# Patient Record
Sex: FEMALE | Race: WHITE | NOT HISPANIC OR LATINO | Employment: FULL TIME | URBAN - METROPOLITAN AREA
[De-identification: names, ages, dates, MRNs, and addresses within clinical notes are randomized per-mention and may not be internally consistent; named-entity substitution may affect disease eponyms.]

---

## 2024-01-16 ENCOUNTER — TELEMEDICINE (OUTPATIENT)
Dept: GASTROENTEROLOGY | Facility: CLINIC | Age: 54
End: 2024-01-16
Payer: COMMERCIAL

## 2024-01-16 DIAGNOSIS — R13.19 ESOPHAGEAL DYSPHAGIA: ICD-10-CM

## 2024-01-16 DIAGNOSIS — K21.9 GASTROESOPHAGEAL REFLUX DISEASE, UNSPECIFIED WHETHER ESOPHAGITIS PRESENT: Primary | ICD-10-CM

## 2024-01-16 PROCEDURE — 99204 OFFICE O/P NEW MOD 45 MIN: CPT | Performed by: INTERNAL MEDICINE

## 2024-01-16 ASSESSMENT — ENCOUNTER SYMPTOMS
MUSCULOSKELETAL NEGATIVE: 1
PSYCHIATRIC NEGATIVE: 1
CARDIOVASCULAR NEGATIVE: 1
CONSTITUTIONAL NEGATIVE: 1
NEUROLOGICAL NEGATIVE: 1
EYES NEGATIVE: 1
RESPIRATORY NEGATIVE: 1
ENDOCRINE NEGATIVE: 1
ROS GI COMMENTS: AS IN HPI

## 2024-01-16 NOTE — PROGRESS NOTES
Subjective     History of Present Illness:   Ivette Jerome is a 53 y.o. female who presents to GI clinic for refractory heartburn.    Reflux for a long time and has been on PPIS for a long time   But more recently has been having worsening symptoms for the past 6 months  Main symptom is heartburn, constant cough, raspy voice, ocassional regurgitation  Currently on dexilant 60 and pepsid 40 and no significant change in symptoms   Dysphagia that improved with dilation she says 100% better   Original EGD showed questionable Bauer's esophagus with negative biopsies as well as small hiatal hernia.  Empiric dilation was done.  Subsequently she had an esophagram that mentioned a small hiatal hernia with mild reflux, distal esophageal mucosal irregularity, barium tablet did not pass the level of the mid esophagus, premature spillage of the contrast into the esophagus prior to initiation of swallowing.  In addition to tertiary contractions.  Patient said that she would like to get off of medications if possible because she does not think it is helping at all          Review of Systems  Review of Systems   Constitutional: Negative.    HENT: Negative.     Eyes: Negative.    Respiratory: Negative.     Cardiovascular: Negative.    Gastrointestinal:         As in HPI    Endocrine: Negative.    Genitourinary: Negative.    Musculoskeletal: Negative.    Skin: Negative.    Neurological: Negative.    Psychiatric/Behavioral: Negative.         Past Medical History   has no past medical history on file.     Social History    Nonsmoker. Nonalcoholic     Family History  family history is not on file.     Allergies  Not on File    Medications  No current outpatient medications     Objective   There were no vitals taken for this visit.    exam was not done because this is a virtual visit.          Assessment/Plan   Ivette Jerome is a 53 y.o. female who presents to GI clinic for possible refractory GERD.  Patient has significant reflux symptoms  with typical symptoms of heartburn regurgitation and also some atypical symptoms of globus and hoarseness.  I am not really sure if this is all truly refractory heartburn or she has other problems such as dysmotility problems particularly with the results of the esophagram.  I did explain to her that I cannot make anything out of the premature spillage of the contrast but there appears to be some delay and contrast passage on the esophagram     since she wants to stop the medications anyways I would rather she does that slowly and I told her to taper down the dexilant slowly and then stop it completely   We will schedule her for an EMOT as well as a ph impedance off meds to evaluate for any other motility disorders as well Determine the reflux burden to determine the reflux burden.  If she does have significant pathologic reflux and will consider surgical repair of the hiatal          Matthieu Boo MD

## 2024-02-05 ENCOUNTER — HOSPITAL ENCOUNTER (OUTPATIENT)
Dept: GASTROENTEROLOGY | Facility: HOSPITAL | Age: 54
Discharge: HOME | End: 2024-02-05
Payer: COMMERCIAL

## 2024-02-05 VITALS
DIASTOLIC BLOOD PRESSURE: 65 MMHG | SYSTOLIC BLOOD PRESSURE: 117 MMHG | RESPIRATION RATE: 18 BRPM | OXYGEN SATURATION: 98 % | HEART RATE: 89 BPM

## 2024-02-05 DIAGNOSIS — K21.9 GASTROESOPHAGEAL REFLUX DISEASE, UNSPECIFIED WHETHER ESOPHAGITIS PRESENT: ICD-10-CM

## 2024-02-05 PROCEDURE — 91034 GASTROESOPHAGEAL REFLUX TEST: CPT

## 2024-02-05 PROCEDURE — 91010 ESOPHAGUS MOTILITY STUDY: CPT

## 2024-02-05 PROCEDURE — 91010 ESOPHAGUS MOTILITY STUDY: CPT | Performed by: INTERNAL MEDICINE

## 2024-02-05 PROCEDURE — 2720000007 HC OR 272 NO HCPCS

## 2024-02-05 RX ORDER — DEXLANSOPRAZOLE 60 MG/1
60 CAPSULE, DELAYED RELEASE ORAL DAILY
COMMUNITY
Start: 2023-08-30

## 2024-02-05 RX ORDER — FAMOTIDINE 40 MG/1
40 TABLET, FILM COATED ORAL NIGHTLY
COMMUNITY
Start: 2023-09-27

## 2024-02-05 RX ORDER — ACYCLOVIR 400 MG/1
400 TABLET ORAL DAILY
COMMUNITY
End: 2024-03-12 | Stop reason: WASHOUT

## 2024-02-05 RX ORDER — LIDOCAINE HYDROCHLORIDE 20 MG/ML
1 JELLY TOPICAL ONCE
Status: CANCELLED | OUTPATIENT
Start: 2024-02-05 | End: 2024-02-05

## 2024-02-07 PROCEDURE — 91038 ESOPH IMPED FUNCT TEST > 1HR: CPT | Performed by: INTERNAL MEDICINE

## 2024-02-13 ENCOUNTER — TELEMEDICINE (OUTPATIENT)
Dept: GASTROENTEROLOGY | Facility: CLINIC | Age: 54
End: 2024-02-13
Payer: COMMERCIAL

## 2024-02-13 DIAGNOSIS — R12 FUNCTIONAL HEARTBURN: Primary | ICD-10-CM

## 2024-02-13 PROCEDURE — 99214 OFFICE O/P EST MOD 30 MIN: CPT | Performed by: INTERNAL MEDICINE

## 2024-02-13 RX ORDER — FLUOXETINE HYDROCHLORIDE 20 MG/1
20 CAPSULE ORAL ONCE
Status: DISCONTINUED | OUTPATIENT
Start: 2024-02-13 | End: 2024-02-15

## 2024-02-13 ASSESSMENT — ENCOUNTER SYMPTOMS
RESPIRATORY NEGATIVE: 1
MUSCULOSKELETAL NEGATIVE: 1
CONSTITUTIONAL NEGATIVE: 1
CARDIOVASCULAR NEGATIVE: 1

## 2024-02-13 NOTE — PROGRESS NOTES
Subjective     History of Present Illness:   Ivette Jerome is a 53 y.o. female who presents to GI clinic for follow-up of GERD.  She had originally seen me because of concern for refractory GERD has been on PPIs for a very long time with some typical and atypical symptoms and she had worsening symptoms.  I did an esophageal manometry that showed a small hiatal hernia otherwise normal and then I did a pH impedance test off of medications which interestingly did not show any significant pathologic acid or nonacid reflux but there was a positive symptom correlation suggestive of reflux hypersensitivity.  She said that she continues to have significant symptoms and she got a ton of heartburn when she tried to get off of the medications despite trying to go off of it slowly as I instructed her.  Since then she has not gone back on it because she wanted to talk to me first but says that she has been taking Pepcid on an as-needed basis but she has not gone back on PPIs.      Review of Systems  Review of Systems   Constitutional: Negative.    Respiratory: Negative.     Cardiovascular: Negative.    Musculoskeletal: Negative.    Skin: Negative.        Past Medical History   has no past medical history on file.     Social History   Non-smoker nonalcoholic.    Family History  family history is not on file.     Allergies  No Known Allergies    Medications  Current Outpatient Medications   Medication Instructions    acyclovir (ZOVIRAX) 400 mg, oral, Daily    Dexilant 60 mg, oral, Daily    famotidine (PEPCID) 40 mg, oral, Nightly    omalizumab (XOLAIR) 150 mg, subcutaneous, Every 28 days        Objective   There were no vitals taken for this visit.   There was no physical exam done because patient was a virtual visit.      Assessment/Plan   Ivette Jerome is a 53 y.o. female who presents to GI clinic for follow-up of possible refractory GERD.  I told her that I do not think that this is really refractory GERD.  I truly think that this  is probably reflux hypersensitivity rather than refractory GERD because of the negative impedance test.  Although the test could be falsely negative because it was only 24-hour test I still think that this could be reflux hypersensitivity.    I told her to continue taking Pepcid as needed but I will also start her on fluoxetine at 20 mg once daily to help with reflux hypersensitivity.  Will do another call in 3 months to determine how she is feeling because I told her to give it at least 12 weeks prior to determining if it is effective or not.  .          Matthieu Boo MD

## 2024-02-15 DIAGNOSIS — R12 FUNCTIONAL HEARTBURN: Primary | ICD-10-CM

## 2024-02-15 RX ORDER — FLUOXETINE HYDROCHLORIDE 20 MG/1
20 CAPSULE ORAL DAILY
Qty: 30 CAPSULE | Refills: 11 | Status: SHIPPED | OUTPATIENT
Start: 2024-02-15 | End: 2025-02-14

## 2024-02-20 ENCOUNTER — APPOINTMENT (OUTPATIENT)
Dept: GASTROENTEROLOGY | Facility: CLINIC | Age: 54
End: 2024-02-20
Payer: COMMERCIAL

## 2024-05-24 ENCOUNTER — TELEPHONE (OUTPATIENT)
Dept: GASTROENTEROLOGY | Facility: CLINIC | Age: 54
End: 2024-05-24

## 2024-06-05 ENCOUNTER — APPOINTMENT (OUTPATIENT)
Dept: GASTROENTEROLOGY | Facility: CLINIC | Age: 54
End: 2024-06-05
Payer: COMMERCIAL

## 2024-06-06 ENCOUNTER — OFFICE VISIT (OUTPATIENT)
Dept: GASTROENTEROLOGY | Facility: CLINIC | Age: 54
End: 2024-06-06
Payer: COMMERCIAL

## 2024-06-06 DIAGNOSIS — R12 FUNCTIONAL HEARTBURN: Primary | ICD-10-CM

## 2024-06-06 DIAGNOSIS — K21.9 GASTROESOPHAGEAL REFLUX DISEASE, UNSPECIFIED WHETHER ESOPHAGITIS PRESENT: ICD-10-CM

## 2024-06-06 PROCEDURE — 99214 OFFICE O/P EST MOD 30 MIN: CPT | Performed by: INTERNAL MEDICINE

## 2024-06-06 ASSESSMENT — ENCOUNTER SYMPTOMS
MUSCULOSKELETAL NEGATIVE: 1
CARDIOVASCULAR NEGATIVE: 1
RESPIRATORY NEGATIVE: 1
CONSTITUTIONAL NEGATIVE: 1

## 2024-06-06 NOTE — PROGRESS NOTES
Subjective   Virtual or Telephone Consent    An interactive audio and video telecommunication system which permits real time communications between the patient (at the originating site) and provider (at the distant site) was utilized to provide this telehealth service.   Verbal consent was requested and obtained from Ivette Jerome on this date, 06/06/24 for a telehealth visit.       History of Present Illness:   Ivette Jerome is a 54 y.o. female who presents to GI clinic for follow-up  Originally seen for refractory GERD , ph testing showed no evidence of GERD and I was concerned about reflux hypersensitivity so I started her on Fluoxetine   In follow-up today she says she has not noticed a huge change in symptoms   Taking 20 mg of pepsid 2-3 times a day and she feels it helps only for a little bit but has to take it late at night as well   The 20 mg of Prozac every day since mid-February did not think it is making any difference     In the last 2 days she has been using virgin cecilio oil and warm water and she felt that she has been having much less episodes of heartburn than she was before. She does feel significantly better and not sure if this coincidental or not   She is also at constant worry about developing Bauer's esophagus because of her family history          Review of Systems  Review of Systems   Constitutional: Negative.    Respiratory: Negative.     Cardiovascular: Negative.    Musculoskeletal: Negative.    Skin: Negative.        Past Medical History   has no past medical history on file.     Social History        Family History  family history is not on file.     Allergies  No Known Allergies    Medications  Current Outpatient Medications   Medication Instructions    Dexilant 60 mg, oral, Daily    famotidine (PEPCID) 40 mg, oral, Nightly    FLUoxetine (PROZAC) 20 mg, oral, Daily    omalizumab (XOLAIR) 150 mg, subcutaneous, Every 28 days       Objective   There were no vitals filed for this  visit.  Physical Exam  Vitals reviewed.   Constitutional:       Appearance: Normal appearance.   Cardiovascular:      Rate and Rhythm: Normal rate and regular rhythm.      Pulses: Normal pulses.   Pulmonary:      Effort: Pulmonary effort is normal.      Breath sounds: Normal breath sounds.   Abdominal:      General: Abdomen is flat. Bowel sounds are normal. There is no distension.      Palpations: Abdomen is soft.      Tenderness: There is no abdominal tenderness.   Musculoskeletal:         General: Normal range of motion.   Neurological:      Mental Status: She is alert.                 Assessment/Plan   Ivette Jerome is a 54 y.o. female who presents to GI clinic for follow-up of GERD  My original impression was this was reflux hypersensitivity rahter than GERD because she did not respon to high dose PPIS and because of the findings on impedance  Howevere she did not have any response to fluoxetine  She could have some reflux because of the hiatal hernia that was not idenitifed on the 24 hour study and probably a 96 hour study may help with that futher     For now we will continue on the same therapy with virgin olive oil pepsid as now  Stop fluoxetine  Follow=up depending on results can consider repeat EGD with BRAOV testring     .          Matthieu Boo MD

## 2024-06-11 ENCOUNTER — APPOINTMENT (OUTPATIENT)
Dept: GASTROENTEROLOGY | Facility: CLINIC | Age: 54
End: 2024-06-11
Payer: COMMERCIAL

## 2025-04-30 DIAGNOSIS — K21.00 GASTROESOPHAGEAL REFLUX DISEASE WITH ESOPHAGITIS, UNSPECIFIED WHETHER HEMORRHAGE: Primary | ICD-10-CM

## 2025-05-01 RX ORDER — FAMOTIDINE 40 MG/1
40 TABLET, FILM COATED ORAL NIGHTLY
Qty: 90 TABLET | Refills: 3 | Status: SHIPPED | OUTPATIENT
Start: 2025-05-01 | End: 2026-05-01

## 2025-05-07 ENCOUNTER — APPOINTMENT (OUTPATIENT)
Dept: GASTROENTEROLOGY | Facility: CLINIC | Age: 55
End: 2025-05-07
Payer: COMMERCIAL

## 2025-05-07 DIAGNOSIS — K21.9 GASTROESOPHAGEAL REFLUX DISEASE, UNSPECIFIED WHETHER ESOPHAGITIS PRESENT: Primary | ICD-10-CM

## 2025-05-07 PROCEDURE — 99214 OFFICE O/P EST MOD 30 MIN: CPT | Performed by: INTERNAL MEDICINE

## 2025-05-07 RX ORDER — VONOPRAZAN FUMARATE 13.36 MG/1
10 TABLET ORAL DAILY
Qty: 30 TABLET | Refills: 2 | Status: SHIPPED | OUTPATIENT
Start: 2025-05-07 | End: 2025-07-06

## 2025-05-07 ASSESSMENT — ENCOUNTER SYMPTOMS
MUSCULOSKELETAL NEGATIVE: 1
CONSTITUTIONAL NEGATIVE: 1
CARDIOVASCULAR NEGATIVE: 1
RESPIRATORY NEGATIVE: 1

## 2025-05-07 NOTE — PROGRESS NOTES
Subjective   Virtual or Telephone Consent    An interactive audio and video telecommunication system which permits real time communications between the patient (at the originating site) and provider (at the distant site) was utilized to provide this telehealth service.   Verbal consent was requested and obtained from Ivette Jerome on this date, 05/07/25 for a telehealth visit and the patient's location was confirmed at the time of the visit.       History of Present Illness:   Ivette Jerome is a 55 y.o. female who presents to GI clinic for follow-up of GERD    Last seen 6 months ago  Originally seen for refractory GERD , ph testing showed no evidence of GERD and I was concerned about reflux hypersensitivity so I started her on Fluoxetine   In follow-up today she says she has not noticed a huge change in symptoms   Taking 20 mg of pepsid 2-3 times a day and she feels it helps only for a little bit but has to take it late at night as well   The 20 mg of Prozac every day since mid-February did not think it is making any difference   When last seen she had been using virgin olive oil and warm water with some improvement    This had helped a little bit but continued to have persistent heartburn   Takes Pepsid 40 mg twice daily every day and still using Tums   It does help but then it fades away   No chest pain no dysphagia no regurgitation  In the past PPIs did not work well       Review of Systems  Review of Systems   Constitutional: Negative.    Respiratory: Negative.     Cardiovascular: Negative.    Musculoskeletal: Negative.    Skin: Negative.        Past Medical History   has no past medical history on file.     Social History    Nonmoking nonalcoholic    Family History  family history is not on file.     Allergies  RX Allergies[1]    Medications  Current Outpatient Medications   Medication Instructions    famotidine (PEPCID) 40 mg, oral, Nightly    omalizumab (XOLAIR) 150 mg, subcutaneous, Every 28 days       Objective    No PE was done since this patient was virtual         Assessment/Plan   Ivette Jerome is a 55 y.o. female who presents to GI clinic for follow-up of GERD    Still unclear whether this is truly GERD or reflux hypersensitivity   Persistent symptoms despite PPIs  Also did not respond to SSRI for reflux hypersensitivity    Can repeat EGD with biopsies and BRAVO. Would check for EOE although symptoms atypical   She does have allergies and asthma history   Alternatively can try PCAB     She prefer medication trial  Will let me know how she is doing and we can decide next step .          Matthieu Boo MD              [1] No Known Allergies

## 2025-05-09 ENCOUNTER — APPOINTMENT (OUTPATIENT)
Dept: GASTROENTEROLOGY | Facility: CLINIC | Age: 55
End: 2025-05-09
Payer: COMMERCIAL

## 2025-06-18 ENCOUNTER — TELEPHONE (OUTPATIENT)
Dept: GASTROENTEROLOGY | Facility: CLINIC | Age: 55
End: 2025-06-18
Payer: COMMERCIAL

## 2025-06-18 NOTE — TELEPHONE ENCOUNTER
Patient called and LVM and stated they needed a pre-authorization for the appt she already had because insurance does not want to cover because we are OON for her.  She wasn't sure if this was something you could take care of or if you knew who to contact.  Thank you.

## 2025-06-23 DIAGNOSIS — K21.9 GASTROESOPHAGEAL REFLUX DISEASE, UNSPECIFIED WHETHER ESOPHAGITIS PRESENT: Primary | ICD-10-CM

## 2025-06-24 RX ORDER — VONOPRAZAN FUMARATE 13.36 MG/1
10 TABLET ORAL DAILY
Qty: 90 TABLET | Refills: 3 | Status: SHIPPED | OUTPATIENT
Start: 2025-06-24 | End: 2026-06-24

## 2025-07-17 ENCOUNTER — PATIENT MESSAGE (OUTPATIENT)
Dept: GASTROENTEROLOGY | Facility: CLINIC | Age: 55
End: 2025-07-17
Payer: COMMERCIAL

## 2025-08-26 DIAGNOSIS — R12 HEARTBURN: ICD-10-CM

## 2025-08-26 DIAGNOSIS — K21.9 GASTROESOPHAGEAL REFLUX DISEASE, UNSPECIFIED WHETHER ESOPHAGITIS PRESENT: Primary | ICD-10-CM

## 2025-08-26 RX ORDER — VONOPRAZAN FUMARATE 13.36 MG/1
10 TABLET ORAL DAILY
Qty: 30 TABLET | Refills: 11 | Status: SHIPPED | OUTPATIENT
Start: 2025-08-26 | End: 2026-08-26

## 2025-08-27 ENCOUNTER — TELEPHONE (OUTPATIENT)
Dept: GASTROENTEROLOGY | Facility: CLINIC | Age: 55
End: 2025-08-27
Payer: COMMERCIAL

## 2025-09-03 ENCOUNTER — APPOINTMENT (OUTPATIENT)
Dept: GASTROENTEROLOGY | Facility: CLINIC | Age: 55
End: 2025-09-03
Payer: COMMERCIAL

## 2025-09-03 ASSESSMENT — ENCOUNTER SYMPTOMS
MUSCULOSKELETAL NEGATIVE: 1
RESPIRATORY NEGATIVE: 1
CONSTITUTIONAL NEGATIVE: 1
CARDIOVASCULAR NEGATIVE: 1